# Patient Record
Sex: MALE | Race: WHITE | Employment: OTHER | URBAN - NONMETROPOLITAN AREA
[De-identification: names, ages, dates, MRNs, and addresses within clinical notes are randomized per-mention and may not be internally consistent; named-entity substitution may affect disease eponyms.]

---

## 2017-01-01 ENCOUNTER — HOSPITAL ENCOUNTER (EMERGENCY)
Age: 67
Discharge: HOME | End: 2017-09-27
Attending: EMERGENCY MEDICINE
Payer: MEDICARE

## 2017-01-01 ENCOUNTER — APPOINTMENT (OUTPATIENT)
Dept: GENERAL RADIOLOGY | Age: 67
End: 2017-01-01
Payer: MEDICARE

## 2017-01-01 ENCOUNTER — HOSPITAL ENCOUNTER (OUTPATIENT)
Dept: CARDIAC REHAB | Age: 67
Setting detail: THERAPIES SERIES
Discharge: HOME | End: 2017-10-09
Payer: MEDICARE

## 2017-01-01 ENCOUNTER — HOSPITAL ENCOUNTER (OUTPATIENT)
Dept: CARDIAC REHAB | Age: 67
Setting detail: THERAPIES SERIES
Discharge: HOME | End: 2017-10-06
Payer: MEDICARE

## 2017-01-01 ENCOUNTER — APPOINTMENT (OUTPATIENT)
Dept: CARDIAC REHAB | Age: 67
End: 2017-01-01
Payer: MEDICARE

## 2017-01-01 ENCOUNTER — HOSPITAL ENCOUNTER (OUTPATIENT)
Dept: CARDIAC REHAB | Age: 67
Setting detail: THERAPIES SERIES
Discharge: HOME | End: 2017-10-11
Payer: MEDICARE

## 2017-01-01 ENCOUNTER — HOSPITAL ENCOUNTER (OUTPATIENT)
Dept: CARDIAC REHAB | Age: 67
Setting detail: THERAPIES SERIES
Discharge: HOME | End: 2017-10-04
Payer: MEDICARE

## 2017-01-01 ENCOUNTER — HOSPITAL ENCOUNTER (OUTPATIENT)
Dept: CARDIAC REHAB | Age: 67
Setting detail: THERAPIES SERIES
Discharge: HOME | End: 2017-09-27
Payer: MEDICARE

## 2017-01-01 ENCOUNTER — HOSPITAL ENCOUNTER (OUTPATIENT)
Dept: CARDIAC REHAB | Age: 67
Setting detail: THERAPIES SERIES
Discharge: HOME | End: 2017-09-29
Payer: MEDICARE

## 2017-01-01 ENCOUNTER — HOSPITAL ENCOUNTER (OUTPATIENT)
Dept: CARDIAC REHAB | Age: 67
Setting detail: THERAPIES SERIES
Discharge: HOME | End: 2017-09-22
Payer: MEDICARE

## 2017-01-01 ENCOUNTER — HOSPITAL ENCOUNTER (OUTPATIENT)
Dept: CARDIAC REHAB | Age: 67
Setting detail: THERAPIES SERIES
Discharge: HOME | End: 2017-10-02
Payer: MEDICARE

## 2017-01-01 VITALS
DIASTOLIC BLOOD PRESSURE: 75 MMHG | SYSTOLIC BLOOD PRESSURE: 165 MMHG | WEIGHT: 270 LBS | OXYGEN SATURATION: 97 % | HEART RATE: 78 BPM | HEIGHT: 71 IN | TEMPERATURE: 98.4 F | RESPIRATION RATE: 18 BRPM | BODY MASS INDEX: 37.8 KG/M2

## 2017-01-01 DIAGNOSIS — M23.92 INTERNAL DERANGEMENT OF KNEE, LEFT: Primary | ICD-10-CM

## 2017-01-01 PROCEDURE — 99283 EMERGENCY DEPT VISIT LOW MDM: CPT

## 2017-01-01 PROCEDURE — 93798 PHYS/QHP OP CAR RHAB W/ECG: CPT

## 2017-01-01 PROCEDURE — 73564 X-RAY EXAM KNEE 4 OR MORE: CPT

## 2017-01-01 RX ORDER — TRAMADOL HYDROCHLORIDE 50 MG/1
50 TABLET ORAL EVERY 6 HOURS PRN
Qty: 12 TABLET | Refills: 0 | Status: SHIPPED | OUTPATIENT
Start: 2017-01-01 | End: 2018-01-01 | Stop reason: ALTCHOICE

## 2017-01-01 ASSESSMENT — PAIN SCALES - GENERAL: PAINLEVEL_OUTOF10: 8

## 2017-01-01 ASSESSMENT — PAIN DESCRIPTION - LOCATION
LOCATION: KNEE
LOCATION: KNEE

## 2017-01-01 ASSESSMENT — PAIN - FUNCTIONAL ASSESSMENT: PAIN_FUNCTIONAL_ASSESSMENT: ADVANCED DEMENTIA

## 2017-01-01 ASSESSMENT — PAIN DESCRIPTION - ORIENTATION
ORIENTATION: LEFT
ORIENTATION: LEFT

## 2017-08-14 ENCOUNTER — HOSPITAL ENCOUNTER (OUTPATIENT)
Dept: CARDIAC REHAB | Age: 67
Setting detail: THERAPIES SERIES
Discharge: HOME | End: 2017-08-14
Payer: MEDICARE

## 2017-08-14 VITALS — WEIGHT: 270 LBS | BODY MASS INDEX: 37.8 KG/M2 | HEIGHT: 71 IN

## 2017-08-14 PROCEDURE — 93798 PHYS/QHP OP CAR RHAB W/ECG: CPT

## 2017-08-14 RX ORDER — FUROSEMIDE 20 MG/1
20 TABLET ORAL DAILY
COMMUNITY

## 2017-08-16 ENCOUNTER — HOSPITAL ENCOUNTER (OUTPATIENT)
Dept: CARDIAC REHAB | Age: 67
Setting detail: THERAPIES SERIES
Discharge: HOME | End: 2017-08-16
Payer: MEDICARE

## 2017-08-16 PROCEDURE — 93798 PHYS/QHP OP CAR RHAB W/ECG: CPT

## 2017-08-18 ENCOUNTER — HOSPITAL ENCOUNTER (OUTPATIENT)
Dept: CARDIAC REHAB | Age: 67
Setting detail: THERAPIES SERIES
Discharge: HOME | End: 2017-08-18
Payer: MEDICARE

## 2017-08-18 PROCEDURE — 93798 PHYS/QHP OP CAR RHAB W/ECG: CPT

## 2017-08-18 RX ORDER — AMIODARONE HYDROCHLORIDE 200 MG/1
200 TABLET ORAL DAILY
COMMUNITY

## 2017-08-18 RX ORDER — HYDRALAZINE HYDROCHLORIDE 50 MG/1
25 TABLET, FILM COATED ORAL DAILY
COMMUNITY

## 2017-08-18 RX ORDER — M-VIT,TX,IRON,MINS/CALC/FOLIC 27MG-0.4MG
1 TABLET ORAL DAILY
COMMUNITY

## 2017-08-18 RX ORDER — DOXAZOSIN 2 MG/1
2 TABLET ORAL DAILY
COMMUNITY

## 2017-08-18 RX ORDER — SPIRONOLACTONE 25 MG/1
25 TABLET ORAL DAILY
COMMUNITY
End: 2018-01-01 | Stop reason: ALTCHOICE

## 2017-08-21 ENCOUNTER — HOSPITAL ENCOUNTER (OUTPATIENT)
Dept: CARDIAC REHAB | Age: 67
Setting detail: THERAPIES SERIES
Discharge: HOME | End: 2017-08-21
Payer: MEDICARE

## 2017-08-21 PROCEDURE — 93798 PHYS/QHP OP CAR RHAB W/ECG: CPT

## 2017-08-23 ENCOUNTER — HOSPITAL ENCOUNTER (OUTPATIENT)
Dept: CARDIAC REHAB | Age: 67
Setting detail: THERAPIES SERIES
Discharge: HOME | End: 2017-08-23
Payer: MEDICARE

## 2017-08-23 PROCEDURE — 93798 PHYS/QHP OP CAR RHAB W/ECG: CPT

## 2017-08-25 ENCOUNTER — HOSPITAL ENCOUNTER (OUTPATIENT)
Dept: CARDIAC REHAB | Age: 67
Setting detail: THERAPIES SERIES
Discharge: HOME | End: 2017-08-25
Payer: MEDICARE

## 2017-08-25 PROCEDURE — 93798 PHYS/QHP OP CAR RHAB W/ECG: CPT

## 2017-08-28 ENCOUNTER — HOSPITAL ENCOUNTER (OUTPATIENT)
Dept: CARDIAC REHAB | Age: 67
Setting detail: THERAPIES SERIES
Discharge: HOME | End: 2017-08-28
Payer: MEDICARE

## 2017-08-30 ENCOUNTER — HOSPITAL ENCOUNTER (OUTPATIENT)
Dept: CARDIAC REHAB | Age: 67
Setting detail: THERAPIES SERIES
Discharge: HOME | End: 2017-08-30
Payer: MEDICARE

## 2017-08-30 PROCEDURE — 93798 PHYS/QHP OP CAR RHAB W/ECG: CPT

## 2017-09-01 ENCOUNTER — HOSPITAL ENCOUNTER (OUTPATIENT)
Dept: CARDIAC REHAB | Age: 67
Setting detail: THERAPIES SERIES
Discharge: HOME | End: 2017-09-01
Payer: MEDICARE

## 2017-09-01 PROCEDURE — 93798 PHYS/QHP OP CAR RHAB W/ECG: CPT

## 2017-09-06 ENCOUNTER — HOSPITAL ENCOUNTER (OUTPATIENT)
Dept: CARDIAC REHAB | Age: 67
Setting detail: THERAPIES SERIES
Discharge: HOME | End: 2017-09-06
Payer: MEDICARE

## 2017-09-06 PROCEDURE — 93798 PHYS/QHP OP CAR RHAB W/ECG: CPT

## 2017-09-08 ENCOUNTER — HOSPITAL ENCOUNTER (OUTPATIENT)
Dept: CARDIAC REHAB | Age: 67
Setting detail: THERAPIES SERIES
Discharge: HOME | End: 2017-09-08
Payer: MEDICARE

## 2017-09-08 PROCEDURE — 93798 PHYS/QHP OP CAR RHAB W/ECG: CPT

## 2017-09-08 PROCEDURE — 93797 PHYS/QHP OP CAR RHAB WO ECG: CPT

## 2017-09-11 ENCOUNTER — HOSPITAL ENCOUNTER (OUTPATIENT)
Dept: CARDIAC REHAB | Age: 67
Setting detail: THERAPIES SERIES
Discharge: HOME | End: 2017-09-11
Payer: MEDICARE

## 2017-09-11 PROCEDURE — 93798 PHYS/QHP OP CAR RHAB W/ECG: CPT

## 2017-09-13 ENCOUNTER — HOSPITAL ENCOUNTER (OUTPATIENT)
Dept: CARDIAC REHAB | Age: 67
Setting detail: THERAPIES SERIES
Discharge: HOME | End: 2017-09-13
Payer: MEDICARE

## 2017-09-13 PROCEDURE — 93798 PHYS/QHP OP CAR RHAB W/ECG: CPT

## 2017-09-15 ENCOUNTER — HOSPITAL ENCOUNTER (OUTPATIENT)
Dept: CARDIAC REHAB | Age: 67
Setting detail: THERAPIES SERIES
Discharge: HOME | End: 2017-09-15
Payer: MEDICARE

## 2017-09-15 PROCEDURE — 93798 PHYS/QHP OP CAR RHAB W/ECG: CPT

## 2017-09-18 ENCOUNTER — HOSPITAL ENCOUNTER (OUTPATIENT)
Dept: CARDIAC REHAB | Age: 67
Setting detail: THERAPIES SERIES
Discharge: HOME | End: 2017-09-18
Payer: MEDICARE

## 2017-09-18 PROCEDURE — 93798 PHYS/QHP OP CAR RHAB W/ECG: CPT

## 2017-09-20 ENCOUNTER — HOSPITAL ENCOUNTER (OUTPATIENT)
Dept: CARDIAC REHAB | Age: 67
Setting detail: THERAPIES SERIES
Discharge: HOME | End: 2017-09-20
Payer: MEDICARE

## 2017-09-20 PROCEDURE — 93798 PHYS/QHP OP CAR RHAB W/ECG: CPT

## 2017-09-29 NOTE — PLAN OF CARE
[facility]  Phase 2 Cardiac Rehab Weekly Progress Report      Patient prescribed exercise:  8:00 class. 3 times per week in rehab, 1-4 times per week at home for the amount of sessions/weeks specified by insurance. Current Levels: Treadmill: 1. 4mph/0% for 11 minutes, Schwinn Airdyne: Level 0.6 for 12 minutes, UBE: Level 0.3 for 5 minutes. Progression Discussion: Increase Aerobic exercise 15 minutes to work on endurance. Attempt to increase intensity by 5-20% for each modality this week. Try to increase intensities until Nina Raman rates the exercises a 13-17 on Kevon RPE. Julianna Lombard and injured knee will adjust levels.

## 2017-10-11 NOTE — PLAN OF CARE
Adrianne Hardin, RN Registered Nurse Signed Cardiac Rehab   Plan of Care Date of Service: 2017 8:12 AM   Related encounter[facility]   Phase 2 Cardiac Rehabilitation - [facility]   Individualized Cardiac Treatment Plan   Patient Name: Marcia Greene : 1950 Age: 74 y.o. Account Number: [de-identified] Diagnosis: PCI of RCA Date of Event: 5/15/2017   Physician: Dr Jones Corporal Next Office Visit: 2017 Date Entered Program: 2017   Risk Stratifications: Low Intermediate High   Allergies: No Known Allergies   Individual Cardiac Treatment Plan -EXERCISE    INITIAL 30 DAY 61 DAY 90 DAY FINAL DAY   EXERCISE   ASSESSMENT/PLAN EXERCISE   REASSESSMENT EXERCISE   REASSESSMENT EXERCISE   REASSESSMENT EXERCISE   DISCHARGE/FOLLOW-UP   Date: 2017 Date:2017 Date:10/11/2017 Date: Date:   Session # 1 Session # 15 Session # 25 Session # ** Session # **   Stages of Change Stages of Change Stages of Change Stages of Change Stages of Change   Pre xContemplation   Contemplation   Preparation   Action   Maintenance   Relapse Pre Contemplation   Contemplation   Preparation   Action   Maintenance   xRelapse Pre Contemplation   Contemplation   Preparation   Action   Maintenance   xRelapse Pre Contemplation   Contemplation   Preparation   Action   Maintenance   Relapse Pre Contemplation   Contemplation   Preparation   Action   Maintenance   Relapse   EXERCISE ASSESSMENT EXERCISE ASSESSMENT EXERCISE ASSESSMENT EXERCISE ASSESSMENT EXERCISE ASSESSMENT   6 Min Walk Test   Distance walked: 0.17 miles   METs: 2.3   Max HR:76BPM   RPE: 15   THR:     6 Min Walk Test   Distance walked: ** miles   METs: **   Max HR:** BPM   RPE: **   THR: **   % Change = **%   DASI: 5.04 METS DASI:5.62 METS DASI: 5.07 METS DASI: ** METS DASI: ** METS   Return to Work   [place] on returning to work?   No   Disabled   Retired x *Interventions* *Interventions* *Interventions* *Interventions* *Interventions*   Exercise Prescription   (per physician & CR staff) Exercise Prescription   (per physician & CR staff) Exercise Prescription   (per physician & CR staff) Exercise Prescription   (per physician & CR staff) Exercise Prescription   (per physician & CR staff)   Mode:   xTreadmill (TM)   xSchwinn Airdyne (AD)   xArms Ergometer (AE)   NuStep   Elliptical (E)   6 Step   12 Step MODE:   XTreadmill (TM)   XSchwinn Airdyne (AD)   XArms Ergometer (AE)   NuStep   Elliptical (E)   6 Step   12 Step MODE:   xTreadmill (TM)   xSchwinn xAirdyne (AD)   Arms Ergometer (AE)   NuStep   Elliptical (E)   6 Step   12 Step MODE:   Treadmill (TM)   Schwinn Airdyne (AD)   Arms Ergometer (AE)   NuStep   Elliptical (E)   6 Step   12 Step MODE:   Treadmill (TM)   Schwinn Airdyne (AD)   Arms Ergometer (AE)   NuStep   Elliptical (E)   6 Step   12 Step   Frequency: Larri Acrfernanda will attend Phase 2 Cardiac Rehab 3 days/week. Frequency:   Larri Acres will attend Phase 2 Cardiac Rehab 3 days/week. Frequency:   Larri Acres will attend Phase 2 Cardiac Rehab 3 days/week. Frequency:   Larri Acres will attend Phase 2 Cardiac Rehab ** days/week. Frequency:   Larri Acres encouraged to continue exercise at home 5-7 days/week   Duration:   Total Aerobic exercise: 5 min Duration:   Total Aerobic exercise: 12 min Duration:   Total Aerobic exercise: 15 min Duration:   Total Aerobic exercise: ** min Duration:   Total Aerobic exercise: ** min   Intensity:   RPE = 12-15   Max MET Level = 2.3 Intensity:   RPE = 12-15   Max MET Level = 2.1 Intensity:   RPE = 12-15   Max MET Level = 2.1 Intensity:   RPE = 12-15   Max MET Level = ** Intensity:   RPE = 12-15   Progression: increase aerobic activity up to 12-15 min over next 4 weeks by increasing time 2-3 min/week. Progression:Increase aerobic activity up to 15 min over next 4 weeks. Increase 1 min per session.   Progression:Increase METs when able Farheen Richards got out of his car a few weeks ago and thinks I may have done something to his knee was off rehab for a week but that still has not changed the discomfort  Progression: Progression:   Increase time/intensity when RPE <13, and HR is in [facility]    Resistance Training? Yes No   Wt: 2#   Reps: 8   Progression: Increase reps each session with RPE <12/13. Increase wt. after completing 15 reps with an RPE of <12/13. Resistance Training? Yes No   Wt: 2#   Reps: 12   Progression: Increase reps each session with RPE <12/13. Increase wt. after completing 15 reps with an RPE of <12/13. Resistance Training? Yes    Wt: 3#   Reps: 8  Progression: Increase reps each session with RPE <12/13. Increase wt. after completing 15 reps with an RPE of <12/13. Resistance Training? Yes No   Wt: **#   Reps: **   Progression: Increase reps each session with RPE <12/13. Increase wt. after completing 15eps with an RPE of<12/13. Resistance Training? Yes No   Wt: **#   Reps: **   Progression: Increase reps each session with RPE <12/13. Increase wt. after completing 15 reps with an RPE of <12/13. Initial Workloads   TM: [email or web address] 2.3 METs   AD: 1.1 level 2.3 METs   AE: 0.5 level = 1.6 METs Current Workloads   TM: [email or web address] 2.1 METs   AE: 0.5 level = 1.6 METs Current Workloads   TM: [email or web address] 2.1 METs    AE: 0.6 level = 1.7 METs Current Workloads   TM: **@**%= ** METs   AD: ** level = ** METs   AE: ** level = ** METs Current Workloads   TM: **@**%= ** METs   AD: ** level = ** METs   AE: ** level = ** METs   Home Exercise   *Slava verbalizes planning to walk 2 days/week for 10-15 min on days not in rehab. Home Exercise   *Slava verbalizes planning to walk 2 days/week for 10-15 min on days not in rehab. States that he doesn't sit around and that yesterday had been cutting brush. Home Exercise   *Slava verbalizes planning to walk 3 days/week for 15-30 min on days not in rehab.  Home Exercise   *Slava verbalizes planning to ** ** days/week for ** activity without SOB states that not getting any better. Return to ADL or Hobbies:   Sada Chaudhari would like to improve strength and endurance so he is able to return to daily activity without SOB Return to ADL or Hobbies:   Sada Chaudhari would like to improve strength and endurance so he/she is able to return to ** Return to ADL or Hobbies:   Sada Chaudhari would like to improve strength and endurance so he/she is able to return to **    *MET level required for above goal: 5.0 METs MET level Achieved: 2.1 METS MET level Achieved:2.1 METS MET level Achieved: **METS MET level Achieved: **METS   Individual Cardiac Treatment Plan - Nutrition    NUTRITION   ASSESSMENT/PLAN NUTRITION   REASSESSMENT NUTRITION   REASSESSMENT NUTRITION   REASSESSMENT NUTRITION   DISCHARGE/FOLLOW-UP   Stages of Change Stages of Change Stages of Change Stages of Change Stages of Change   Pre Contemplation   xContemplation   Preparation   Action   Maintenance   Relapse Pre Contemplation   Contemplation   Preparation   Action   Maintenance   xRelapse Pre Contemplation   Contemplation   Preparation   Action   Maintenance   xRelapse Pre Contemplation   Contemplation   Preparation   Action   Maintenance   Relapse Pre Contemplation   Contemplation   Preparation   Action   Maintenance   Relapse   NUTRITION ASSESSMENT NUTRITION ASSESSMENT NUTRITION ASSESSMENT NUTRITION ASSESSMENT NUTRITION ASSESSMENT   Weight Management   Weight: 5'10.5 Height: 270   BMI: 38.3  Weight Management   Weight: 274  Weight Management   Weight: 274  Weight Management   Weight: ** Weight Management   Weight: **   Height: **   BMI: **   Diet   Current Diet: Regular  Diet   Dietary Changes:smaller portion Diet   Dietary Changes:smaller portion Diet   Dietary Changes: Diet   Dietary Improvements:   Alcohol   none   daily   weekly   special   Type: beer Amount: 1       Diet Assessment Tool: RATE YOUR PLATE   *Given to patient to complete and return.  Diet Assessment Tool:   Score: 43    Diet Assessment Tool: RATE YOUR PLATE   Score: **   NUTRITION PLAN NUTRITION PLAN NUTRITION PLAN NUTRITION PLAN NUTRITION PLAN   *Interventions* *Interventions* *Interventions* *Interventions* *Interventions*   Initial Survey given Goal Setting Discussion:   Yes   Goals Reviewed & Updated:   Yes    Follow Up Survey Reviewed & Goals Updated:   Yes No    Professional Referral   Please check if needed. Dietician [employer] Management Referral   Other:  Professional Referral  Professional Referral  Professional Referral  Professional Referral   Any needed referrals have been made? Yes No    *Education* *Education* *Education* *Education* *Education*   Individual Nutrition Counseling  Individual Nutrition Counseling scheduled? Yes No   Date: 9/1/2017  Individual Nutrition Counseling attended? No   Date Attended: father passed away will reschedule Individual Nutrition Counseling attended? Yes No   Date Attended: ** Individual Nutrition Counseling attended? Yes No    *Goals* *Goals* *Goals* *Goals* *Goals*   Initial Nutritional Goals Set   Yes  Previous Nutritional Goals Met? No Previous Nutritional Goals Met? No Previous Nutritional Goals Met? Yes No Previous Nutritional Goals Met?    Yes No   Slava's nutritional goals are as follows:   Complete and return diet survey Slava's nutritional goals are as follows:   GOAL 1:no butter   GOAL 2:smaller portion   GOAL 3: Slava's nutritional goals are as follows:   GOAL 1: no butter  GOAL 2: smaller portion  GOAL 3: Slava's nutritional goals are as follows:   GOAL 1:   GOAL 2:   GOAL 3: Slava's nutritional goals are as follows:   GOAL 1:   GOAL 2:   GOAL 3:   Individual Cardiac Treatment Plan - Psychosocial    PSYCHOSOCIAL   ASSESSMENT/PLAN PSYCHOSOCIAL   REASSESSMENT PSYCHOSOCIAL   REASSESSMENT PSYCHOSOCIAL   REASSESSMENT PSYCHOSOCIAL   DISCHARGE/FOLLOW-UP   Stages of Change Stages of Change Stages of Change Stages of Change Stages of Change   Pre Contemplation   XContemplation   Preparation   Action   Maintenance   Relapse Pre Contemplation   XContemplation   Preparation   Action   Maintenance   Relapse Pre Contemplation   XContemplation   Preparation   Action   Maintenance   Relapse Pre Contemplation   Contemplation   Preparation   Action   Maintenance   Relapse Pre Contemplation   Contemplation   Preparation   Action   Maintenance   Relapse   PSYCHOSOCIAL ASSESSMENT PSYCHOSOCIAL ASSESSMENT PSYCHOSOCIAL ASSESSMENT PSYCHOSOCIAL ASSESSMENT PSYCHOSOCIAL ASSESSMENT   Behavioral Outcomes Behavioral Outcomes Behavioral Outcomes Behavioral Outcomes Behavioral Outcomes   Tool Used: Jackeline & Dudley, Quality of Life Index, Cardiac Version IV   *Given to patient to complete. Tool Used:   Jackeline & Dudley, Quality of Life Index, Cardiac Version IV   QOL Index Score: 23.53   HF:18.93   S&E:26.36   P&S: 26.58   Family: 28.8   Tool Used:   Jackeline & Dudley, Quality of Life Index, Cardiac Version IV   QOL Index Score: **   HF:**   S&E:**   P&S: **   Family: **   PHQ-9 score 7   Depression Severity   Minimal (1-4)   XMild (5-9)   Moderate (10-14)   Moderately Severe (15-19)   Severe (20-27)    PHQ-9 score **   Depression Severity   Minimal (1-4)   Mild (5-9)   Moderate (10-14)   Moderately Severe (15-19)   Severe (20-27)   Does patient have Family Support? Yes    No signs of marital/family distress       Using a scale of 0-10, 0=none, 10=very:   Rate your depression: 1   Rate your anxiety: 1 Using a scale of 0-10, 0=none, 10=very:   Rate your depression: 1   Rate your anxiety: 4 Using a scale of 0-10, 0=none, 10=very:   Rate your depression: 3  Rate your anxiety: 1 Using a scale of 0-10, 0=none, 10=very:   Rate your depression: **   Rate your anxiety: ** Using a scale of 0-10, 0=none, 10=very:   Rate your depression: **   Rate your anxiety: **   Signs and Symptoms of Depression Present? No  Signs and Symptoms of Depression Present?     No   If yes, please explain: ** Check if completed:   Stress management skills   S & S of depression   Relaxation Techniques   Coping Techniques   *Goals* *Goals* *Goals* *Goals* *Goals*   Initial Psychosocial Goals Set   Yes  Previous Psychosocial Goals Met    No Previous Psychosocial Goals Met   No Previous Psychosocial Goals Met? Yes No Previous Psychosocial Goals Met? Yes No   Edmund psychosocial goals are as follows: Attend risk factor education on stress Kodys psychosocial goals are as follows: Attend Risk factor Education Slava's psychosocial goals are as follows:    Attend risk factor education Edmund psychosocial goals are as follows:   **  Edmund psychosocial goals are as follows:   **   Individual Cardiac Treatment Plan - Other: Risk Factor/Education    RISK FACTOR   ASSESSMENT/PLAN RISK FACTOR   REASSESSMENT  RISK FACTOR   REASSESSMENT RISK FACTOR   REASSESSMENT RISK FACTOR   DISCHARGE/FOLLOW-UP   Stages of Change Stages of Change Stages of Change Stages of Change Stages of Change          RISK FACTOR/EDUCATION ASSESSMENT RISK FACTOR/EDUCATION ASSESSMENT RISK FACTOR/EDUCATION ASSESSMENT RISK FACTOR/EDUCATION ASSESSMENT RISK FACTOR /EDUCATION ASSESSMENT   Hypertension   Yes    Resting BP: 122/62   Peak Ex BP:174/68 Medication: Lopressor, Lasix, Catapress  Hypertension   Resting BP: 134/52   Peak Ex BP:128/60   Medication Changes:    No  Hypertension   Resting BP: 124/60  Peak Ex BP:140/58   Medication Changes:   Y No Hypertension   Resting BP: **   Peak Ex BP:**   Medication Changes:   Yes No   ** Hypertension   Resting BP: **   Peak Ex BP:**   Medication Changes:   Yes No   **   Lipids   HLD/DLD   Yes    TOTAL CHOL:   HDL:   LDL:   TRIG:   Medication:Zetia, Lipitor Lipids   Medication Changes:    No    Lipids   Medication Changes:    No     Lipids   Medication Changes:   Yes No   **  Lipids   TOTAL CHOL: **   HDL: **   LDL: **   TRIG: **   Medication Changes:   Yes No   Diabetes   Yes No   FBS: 204   HbA1C:   Monitor Professional Referrals:   *Please check if needed   Diabetes [facility]   Other:     Professional Referrals:   *Please check if needed   Diabetes Clinic   Lipid Clinic   Other:   Preventative Medication Preventative Medication Preventative Medication Preventative Medication Preventative Medication   Aspirin   Yes    Blood Thinner: Clopidogrel/Effient/Brillinta   Yes    Beta Blocker   Yes   Ace Inhibitor   Yes   Statin/Lipid Lowering   Yes  Medication Changes? No Medication Changes? No Medication Changes? Yes No Medication Changes? Yes No   *Education* *Education* *Education* *Education* *Education*   Individual Risk Factor Counseling  Individual Risk Factor Counseling scheduled? Yes    Date:9/8/2017 attended  Individual Risk Factor Counseling attended? Yes    Date Attended: 9/8/2017 Individual Risk Factor Counseling attended? Yes No   Date Attended: ** Individual Risk Factor Counseling attended? Yes No    Does Larri Acres require any additional education? No Does Larri Acres require any additional education? No Does [place]ri Ac[place] require any additional education? No Does Larri Ac[place] require any additional education? Yes No Does Larri Acres require any additional education? Yes No   *Goals* *Goals* *Goals* *Goals* *Goals*   Initial Risk Factor/Education Goals Set? Yes  Previous Risk Factor/Education Goals Met? No Previous Risk Factor/Education Goals Met? Yes  Previous Risk Factor/Education Goals Met? Yes No Previous Risk Factor/Education Goals Met? Yes No   Slvaa's risk factor/education goals are as follows: Attend education classes to improved heart health knowledge Slava's risk factor/education goals are as follows:   Use knowledge gained in education.   Slava's risk factor/education goals are as follows:   Use knowledge gained in education regarding stress mangement Slava's risk factor/education goals are as follows:   **  Slava's risk factor/education goals are as follows:   ** Monitored telemetry has revealed:NSR/PACED   documented arrhythmia at increasing workloads   associated symptoms SOB  Monitored telemetry has revealed:NSR /Paced   documented arrhythmia at increasing workloads   associated symptoms  Monitored telemetry has revealed :NSR/PACED  documented arrhythmia at increasing workloads   associated symptoms  Monitored telemetry has revealed **   documented arrhythmia at increasing workloads   associated symptoms ** Monitored telemetry has revealed **   documented arrhythmia at increasing workloads   associated symptoms **   Physician Response   Cardiac rehab is reasonably and medically necessary for continuous cardiac monitoring surveillance of patient's cardiac activity   Initiate continuous telemerty monitoring and notify me with any concerns   Other  XPhysician Response   Cardiac rehab is reasonably and medically necessary for continuous cardiac monitoring surveillance of patient's cardiac activity   Continue continuous telemerty monitoring and notify me with any concerns   Other  XPhysician Response   Cardiac rehab is reasonably and medically necessary for continuous cardiac monitoring surveillance of patient's cardiac activity   Continue continuous telemerty monitoring and notify me with any concerns   Other Physician Response   Cardiac rehab is reasonably and medically necessary for continuous cardiac monitoring surveillance of patient's cardiac activity   Continue continuous telemerty monitoring and notify me with any concerns   Other     Fermin Velarde RN   Rehab Staff Signature Fermin Velarde RN   Rehab Staff Signature Fermin Velarde RN Rehab Staff   Signature Rehab Staff   Signature Rehab Staff Signature     Cosigned by: Mason Lamb DO at 9/13/2017 5:20 PM   Revision History   Date/Time User Provider Type Action   9/13/2017 5:20 PM Mason Lamb DO Physician Cosign   9/13/2017 9:07 AM Madalyn Parry RN Registered Nurse Sign

## 2018-01-01 ENCOUNTER — HOSPITAL ENCOUNTER (OUTPATIENT)
Dept: GENERAL RADIOLOGY | Age: 68
Discharge: HOME | End: 2018-01-12
Payer: MEDICARE

## 2018-01-01 ENCOUNTER — HOSPITAL ENCOUNTER (EMERGENCY)
Age: 68
Discharge: HOME | End: 2018-06-18
Attending: FAMILY MEDICINE
Payer: MEDICARE

## 2018-01-01 ENCOUNTER — HOSPITAL ENCOUNTER (OUTPATIENT)
Dept: WOMENS IMAGING | Age: 68
Discharge: HOME | End: 2018-06-06
Payer: MEDICARE

## 2018-01-01 ENCOUNTER — APPOINTMENT (OUTPATIENT)
Dept: WOMENS IMAGING | Age: 68
End: 2018-01-01
Payer: MEDICARE

## 2018-01-01 VITALS
RESPIRATION RATE: 11 BRPM | OXYGEN SATURATION: 98 % | HEART RATE: 60 BPM | TEMPERATURE: 98.3 F | DIASTOLIC BLOOD PRESSURE: 54 MMHG | HEIGHT: 71 IN | BODY MASS INDEX: 38.5 KG/M2 | WEIGHT: 275 LBS | SYSTOLIC BLOOD PRESSURE: 153 MMHG

## 2018-01-01 DIAGNOSIS — N63.0 BREAST LUMP: ICD-10-CM

## 2018-01-01 DIAGNOSIS — R42 DIZZINESS: Primary | ICD-10-CM

## 2018-01-01 DIAGNOSIS — M54.5 LOW BACK PAIN, UNSPECIFIED BACK PAIN LATERALITY, UNSPECIFIED CHRONICITY, WITH SCIATICA PRESENCE UNSPECIFIED: ICD-10-CM

## 2018-01-01 LAB
ALBUMIN SERPL-MCNC: 3.2 GM/DL (ref 3.4–5)
ALP BLD-CCNC: 98 U/L (ref 46–116)
ALT SERPL-CCNC: 30 U/L (ref 14–63)
ANION GAP: 4 MEQ/L (ref 8–16)
AST SERPL-CCNC: 24 U/L (ref 15–37)
BASOPHILS # BLD: 0.1 % (ref 0–3)
BILIRUB SERPL-MCNC: 0.6 MG/DL (ref 0.2–1)
BILIRUBIN DIRECT: 0.14 MG/DL (ref 0–0.2)
BUN BLDV-MCNC: 35 MG/DL (ref 7–18)
CHLORIDE BLD-SCNC: 103 MEQ/L (ref 98–107)
CO2: 35 MEQ/L (ref 21–32)
CREAT SERPL-MCNC: 2.3 MG/DL (ref 0.6–1.3)
EKG ATRIAL RATE: 61 BPM
EKG P-R INTERVAL: 282 MS
EKG Q-T INTERVAL: 534 MS
EKG QRS DURATION: 106 MS
EKG QTC CALCULATION (BAZETT): 542 MS
EKG R AXIS: 77 DEGREES
EKG T AXIS: 102 DEGREES
EKG VENTRICULAR RATE: 62 BPM
EOSINOPHILS RELATIVE PERCENT: 1.3 % (ref 0–4)
GFR, ESTIMATED: 30 ML/MIN/1.73M2
GLUCOSE BLD-MCNC: 247 MG/DL (ref 74–106)
HCT VFR BLD CALC: 34.3 % (ref 42–52)
HEMOGLOBIN: 11.1 GM/DL (ref 14–18)
LYMPHOCYTES # BLD: 23 % (ref 15–47)
MCH RBC QN AUTO: 28.4 PG (ref 27–31)
MCHC RBC AUTO-ENTMCNC: 32.3 GM/DL (ref 33–37)
MCV RBC AUTO: 88.1 FL (ref 80–94)
MONOCYTES: 9.2 % (ref 0–12)
PDW BLD-RTO: 16.9 % (ref 11.5–14.5)
PLATELET # BLD: 101 THOU/MM3 (ref 130–400)
PLATELET ESTIMATE: ABNORMAL
PMV BLD AUTO: 9.2 FL (ref 7.4–10.4)
POC CALCIUM: 8.6 MG/DL (ref 8.5–10.1)
POTASSIUM SERPL-SCNC: 3.4 MEQ/L (ref 3.5–5.1)
RBC # BLD: 3.89 MILL/MM3 (ref 4.7–6.1)
SEGS: 66.4 % (ref 43–75)
SODIUM BLD-SCNC: 142 MEQ/L (ref 136–145)
TOTAL PROTEIN: 6.9 GM/DL (ref 6.4–8.2)
TROPONIN I: < 0.017 NG/ML (ref 0.02–0.05)
WBC # BLD: 4.1 THOU/MM3 (ref 4.8–10.8)

## 2018-01-01 PROCEDURE — 85025 COMPLETE CBC W/AUTO DIFF WBC: CPT

## 2018-01-01 PROCEDURE — 2580000003 HC RX 258: Performed by: FAMILY MEDICINE

## 2018-01-01 PROCEDURE — 36415 COLL VENOUS BLD VENIPUNCTURE: CPT

## 2018-01-01 PROCEDURE — 99284 EMERGENCY DEPT VISIT MOD MDM: CPT

## 2018-01-01 PROCEDURE — 93005 ELECTROCARDIOGRAM TRACING: CPT | Performed by: FAMILY MEDICINE

## 2018-01-01 PROCEDURE — 93010 ELECTROCARDIOGRAM REPORT: CPT | Performed by: INTERNAL MEDICINE

## 2018-01-01 PROCEDURE — 80048 BASIC METABOLIC PNL TOTAL CA: CPT

## 2018-01-01 PROCEDURE — 84484 ASSAY OF TROPONIN QUANT: CPT

## 2018-01-01 PROCEDURE — 77066 DX MAMMO INCL CAD BI: CPT

## 2018-01-01 PROCEDURE — 72100 X-RAY EXAM L-S SPINE 2/3 VWS: CPT

## 2018-01-01 PROCEDURE — 73502 X-RAY EXAM HIP UNI 2-3 VIEWS: CPT

## 2018-01-01 PROCEDURE — 80076 HEPATIC FUNCTION PANEL: CPT

## 2018-01-01 RX ORDER — 0.9 % SODIUM CHLORIDE 0.9 %
1000 INTRAVENOUS SOLUTION INTRAVENOUS ONCE
Status: COMPLETED | OUTPATIENT
Start: 2018-01-01 | End: 2018-01-01

## 2018-01-01 RX ORDER — METOLAZONE 10 MG/1
10 TABLET ORAL EVERY OTHER DAY
COMMUNITY

## 2018-01-01 RX ADMIN — SODIUM CHLORIDE 1000 ML: 9 INJECTION, SOLUTION INTRAVENOUS at 11:41

## 2018-01-01 ASSESSMENT — PAIN SCALES - GENERAL
PAINLEVEL_OUTOF10: 5
PAINLEVEL_OUTOF10: 1

## 2018-01-01 ASSESSMENT — PAIN DESCRIPTION - PAIN TYPE
TYPE: ACUTE PAIN
TYPE: ACUTE PAIN

## 2018-01-01 ASSESSMENT — PAIN DESCRIPTION - LOCATION
LOCATION: HEAD
LOCATION: HEAD

## 2018-01-01 ASSESSMENT — PAIN DESCRIPTION - DESCRIPTORS
DESCRIPTORS: ACHING
DESCRIPTORS: ACHING